# Patient Record
Sex: MALE | Race: WHITE
[De-identification: names, ages, dates, MRNs, and addresses within clinical notes are randomized per-mention and may not be internally consistent; named-entity substitution may affect disease eponyms.]

---

## 2019-08-25 ENCOUNTER — HOSPITAL ENCOUNTER (EMERGENCY)
Dept: HOSPITAL 25 - UCCORT | Age: 29
Discharge: HOME HEALTH | End: 2019-08-25
Payer: COMMERCIAL

## 2019-08-25 VITALS — SYSTOLIC BLOOD PRESSURE: 143 MMHG | DIASTOLIC BLOOD PRESSURE: 85 MMHG

## 2019-08-25 DIAGNOSIS — R21: ICD-10-CM

## 2019-08-25 DIAGNOSIS — F17.210: ICD-10-CM

## 2019-08-25 DIAGNOSIS — R60.0: ICD-10-CM

## 2019-08-25 DIAGNOSIS — M00.9: Primary | ICD-10-CM

## 2019-08-25 DIAGNOSIS — R53.83: ICD-10-CM

## 2019-08-25 DIAGNOSIS — R68.83: ICD-10-CM

## 2019-08-25 PROCEDURE — G0463 HOSPITAL OUTPT CLINIC VISIT: HCPCS

## 2019-08-25 PROCEDURE — 99202 OFFICE O/P NEW SF 15 MIN: CPT

## 2019-09-08 NOTE — UC
Skin Complaint HPI





- HPI Summary


HPI Summary: 





Yesterday pt had bilateral skin irritation that itched. When pt itched the areas

, the skin came off and has caused open sores. R ankle is swollen from 

irritation as well. 





- History of Current Complaint


Chief Complaint: UCSkin


Time Seen by Provider: 08/25/19 13:28


Stated Complaint: BILATERAL ANKLE SKIN CONCERN


Hx Obtained From: Patient


Onset/Duration: Gradual Onset, Lasting Days


Skin Exposure Onset/Duration: Days Ago


Timing: Constant


Onset Severity: Mild


Current Severity: Severe


Pain Intensity: 7


Pain Scale Used: 0-10 Numeric


Location: Generalized - over bilateral legs


Character: Swelling, Redness, Painful


Aggravating Factor(s): Nothing, Touch, Other - pressure


Alleviating Factor(s): Nothing


Associated Signs & Symptoms: Positive: Shivering, Chills, Tenderness, Joint 

Swelling





- Allergy/Home Medications


Allergies/Adverse Reactions: 


 Allergies











Allergy/AdvReac Type Severity Reaction Status Date / Time


 


No Known Allergies Allergy   Verified 08/25/19 13:35














PMH/Surg Hx/FS Hx/Imm Hx


Previously Healthy: Yes





- Surgical History


Surgical History: None





- Family History


Known Family History: Positive: Hypertension





- Social History


Alcohol Use: Rare


Substance Use Type: Marijuana, Other


Substance Use Comment - Amount & Last Used: Meth user occasionally. Clean from 

heroin x2 years


Smoking Status (MU): Heavy Every Day Tobacco Smoker


Type: Cigarettes


Amount Used/How Often: 1 PPD





Review of Systems


All Other Systems Reviewed And Are Negative: Yes


Constitutional: Positive: Chills, Fatigue


Skin: Positive: Rash, Other - redness


Musculoskeletal: Positive: Arthralgia, Decreased ROM, Edema, Myalgia


Is Patient Immunocompromised?: No





Physical Exam


Triage Information Reviewed: Yes


Appearance: Well-Nourished, Ill-Appearing, Pain Distress


Vital Signs: 


 Initial Vital Signs











Temp  99.6 F   08/25/19 13:28


 


Pulse  88   08/25/19 13:28


 


Resp  20   08/25/19 13:28


 


BP  143/85   08/25/19 13:28


 


Pulse Ox  98   08/25/19 13:28











Vital Signs Reviewed: Yes


Eye Exam: Normal


ENT Exam: Normal


Dental Exam: Normal


Neck exam: Normal


Respiratory Exam: Normal


Respiratory: Positive: Chest non-tender, Lungs clear, Normal breath sounds


Cardiovascular Exam: Normal


Abdominal Exam: Normal


Abdomen Description: Positive: Nontender, No Organomegaly, Soft


Bowel Sounds: Positive: Present


Musculoskeletal: Positive: Edema @ - surrounding right ankle, ROm is limited in 

dorsi flexion, hard to bear weight


Neurological Exam: Normal


Psychological Exam: Normal


Skin: Positive: Rashes - many scabs and open areas on legs, right ankle 

erythema noted





Course/Dx





- Course


Course Of Treatment: 





hx obtained, exam performed ,meds reviewed, patient has hx of drug use, is 

constantly scratching and picking at skin patient appears to have septic right 

ankle. sent to [place] [facility] for further eval





- Differential Diagnoses - Skin Complaint


Differential Diagnoses: Cellulitis, Contact Dermatitis, Drug Rash, Drug 

Intoxication, Other - septic joint





- Diagnoses


Provider Diagnosis: 


 Septic arthritis of right ankle








Discharge ED





- Sign-Out/Discharge


Documenting (check all that apply): Patient Departure


All imaging exams completed and their final reports reviewed: No Studies





- Discharge Plan


Condition: Stable


Disposition: HOME-RECOMMEND TO ED


Referrals: 


No Primary Care Phys,NOPCP [Primary Care Provider] - 


Additional Instructions: 


1. please report to [facility] for further work up of the ankle issue, they have been 

notified of your situation and are expecting you.





- Billing Disposition and Condition


Condition: STABLE


Disposition: Home-Recommend to ED





- Attestation Statements


Provider Attestation: 


I was available for consult. This patient was seen by the GIO. The patient was 

presented to , seen by and briefly examined by me.





HPI: bilateral skin irritation that itched. When pt itched the areas, the skin 

came off and has caused open sores. R ankle is swollen from irritation as well.





Physical exam: many scabs and open areas on legs, right ankle erythema noted.


Right ankle: erythema, swelling noted, tender to palpate. Limited and painful 

range of motion. 





Assessment/ Plan: Cellulitis with concern for septic arthritis, plan to refer 

to ER for further imaging and definitive management. Patient in agreement . 





 -Pavel Osman MD